# Patient Record
Sex: FEMALE | Race: WHITE | NOT HISPANIC OR LATINO | Employment: FULL TIME | URBAN - METROPOLITAN AREA
[De-identification: names, ages, dates, MRNs, and addresses within clinical notes are randomized per-mention and may not be internally consistent; named-entity substitution may affect disease eponyms.]

---

## 2023-02-06 ENCOUNTER — HOSPITAL ENCOUNTER (EMERGENCY)
Facility: HOSPITAL | Age: 28
Discharge: HOME | End: 2023-02-06
Attending: INTERNAL MEDICINE
Payer: COMMERCIAL

## 2023-02-06 VITALS
HEIGHT: 68 IN | SYSTOLIC BLOOD PRESSURE: 113 MMHG | DIASTOLIC BLOOD PRESSURE: 59 MMHG | OXYGEN SATURATION: 97 % | RESPIRATION RATE: 18 BRPM | HEART RATE: 72 BPM | WEIGHT: 143.19 LBS | TEMPERATURE: 98 F | BODY MASS INDEX: 21.7 KG/M2

## 2023-02-06 DIAGNOSIS — K62.89 ANAL PAIN: ICD-10-CM

## 2023-02-06 DIAGNOSIS — R19.8 RECTAL TENESMUS: Primary | ICD-10-CM

## 2023-02-06 LAB
ALBUMIN SERPL-MCNC: 3.8 G/DL (ref 3.5–5)
ALBUMIN/GLOB SERPL: 1.3 RATIO (ref 1.1–2)
ALP SERPL-CCNC: 98 UNIT/L (ref 40–150)
ALT SERPL-CCNC: 29 UNIT/L (ref 0–55)
APPEARANCE UR: ABNORMAL
APTT PPP: 33.7 SECONDS (ref 23.2–33.7)
AST SERPL-CCNC: 18 UNIT/L (ref 5–34)
B-HCG SERPL QL: NEGATIVE
BACTERIA #/AREA URNS AUTO: ABNORMAL /HPF
BASOPHILS # BLD AUTO: 0.04 X10(3)/MCL (ref 0–0.2)
BASOPHILS NFR BLD AUTO: 0.6 %
BILIRUB UR QL STRIP.AUTO: NEGATIVE MG/DL
BILIRUBIN DIRECT+TOT PNL SERPL-MCNC: 0.4 MG/DL
BUN SERPL-MCNC: 6 MG/DL (ref 7–18.7)
CALCIUM SERPL-MCNC: 9 MG/DL (ref 8.4–10.2)
CHLORIDE SERPL-SCNC: 109 MMOL/L (ref 98–107)
CO2 SERPL-SCNC: 26 MMOL/L (ref 22–29)
COLOR UR AUTO: YELLOW
CREAT SERPL-MCNC: 0.85 MG/DL (ref 0.55–1.02)
EOSINOPHIL # BLD AUTO: 0.09 X10(3)/MCL (ref 0–0.9)
EOSINOPHIL NFR BLD AUTO: 1.3 %
ERYTHROCYTE [DISTWIDTH] IN BLOOD BY AUTOMATED COUNT: 12.5 % (ref 11.5–17)
GFR SERPLBLD CREATININE-BSD FMLA CKD-EPI: >60 MLS/MIN/1.73/M2
GLOBULIN SER-MCNC: 2.9 GM/DL (ref 2.4–3.5)
GLUCOSE SERPL-MCNC: 72 MG/DL (ref 74–100)
GLUCOSE UR QL STRIP.AUTO: NEGATIVE MG/DL
HCT VFR BLD AUTO: 43.8 % (ref 37–47)
HGB BLD-MCNC: 14.3 GM/DL (ref 12–16)
IMM GRANULOCYTES # BLD AUTO: 0.02 X10(3)/MCL (ref 0–0.04)
IMM GRANULOCYTES NFR BLD AUTO: 0.3 %
INR BLD: 0.97 (ref 0–1.3)
KETONES UR QL STRIP.AUTO: NEGATIVE MG/DL
LEUKOCYTE ESTERASE UR QL STRIP.AUTO: ABNORMAL UNIT/L
LYMPHOCYTES # BLD AUTO: 1.64 X10(3)/MCL (ref 0.6–4.6)
LYMPHOCYTES NFR BLD AUTO: 23.4 %
MCH RBC QN AUTO: 30.3 PG
MCHC RBC AUTO-ENTMCNC: 32.6 MG/DL (ref 33–36)
MCV RBC AUTO: 92.8 FL (ref 80–94)
MONOCYTES # BLD AUTO: 0.7 X10(3)/MCL (ref 0.1–1.3)
MONOCYTES NFR BLD AUTO: 10 %
NEUTROPHILS # BLD AUTO: 4.52 X10(3)/MCL (ref 2.1–9.2)
NEUTROPHILS NFR BLD AUTO: 64.4 %
NITRITE UR QL STRIP.AUTO: NEGATIVE
PH UR STRIP.AUTO: 7.5 [PH]
PLATELET # BLD AUTO: 240 X10(3)/MCL (ref 130–400)
PMV BLD AUTO: 10.6 FL (ref 7.4–10.4)
POTASSIUM SERPL-SCNC: 3.8 MMOL/L (ref 3.5–5.1)
PROT SERPL-MCNC: 6.7 GM/DL (ref 6.4–8.3)
PROT UR QL STRIP.AUTO: NEGATIVE MG/DL
PROTHROMBIN TIME: 13.2 SECONDS (ref 12.5–14.5)
RBC # BLD AUTO: 4.72 X10(6)/MCL (ref 4.2–5.4)
RBC #/AREA URNS AUTO: ABNORMAL /HPF
RBC UR QL AUTO: ABNORMAL UNIT/L
SODIUM SERPL-SCNC: 141 MMOL/L (ref 136–145)
SP GR UR STRIP.AUTO: 1.02
SQUAMOUS #/AREA URNS AUTO: ABNORMAL /HPF
UROBILINOGEN UR STRIP-ACNC: 0.2 MG/DL
WBC # SPEC AUTO: 7 X10(3)/MCL (ref 4.5–11.5)
WBC #/AREA URNS AUTO: ABNORMAL /HPF

## 2023-02-06 PROCEDURE — 25000003 PHARM REV CODE 250: Performed by: INTERNAL MEDICINE

## 2023-02-06 PROCEDURE — 81001 URINALYSIS AUTO W/SCOPE: CPT | Performed by: INTERNAL MEDICINE

## 2023-02-06 PROCEDURE — 85730 THROMBOPLASTIN TIME PARTIAL: CPT | Performed by: INTERNAL MEDICINE

## 2023-02-06 PROCEDURE — 80053 COMPREHEN METABOLIC PANEL: CPT | Performed by: INTERNAL MEDICINE

## 2023-02-06 PROCEDURE — 25500020 PHARM REV CODE 255: Performed by: INTERNAL MEDICINE

## 2023-02-06 PROCEDURE — 63600175 PHARM REV CODE 636 W HCPCS: Performed by: INTERNAL MEDICINE

## 2023-02-06 PROCEDURE — 99285 EMERGENCY DEPT VISIT HI MDM: CPT | Mod: 25

## 2023-02-06 PROCEDURE — 85610 PROTHROMBIN TIME: CPT | Performed by: INTERNAL MEDICINE

## 2023-02-06 PROCEDURE — 85025 COMPLETE CBC W/AUTO DIFF WBC: CPT | Performed by: INTERNAL MEDICINE

## 2023-02-06 PROCEDURE — 96361 HYDRATE IV INFUSION ADD-ON: CPT

## 2023-02-06 PROCEDURE — 96374 THER/PROPH/DIAG INJ IV PUSH: CPT

## 2023-02-06 PROCEDURE — 81025 URINE PREGNANCY TEST: CPT | Performed by: INTERNAL MEDICINE

## 2023-02-06 RX ORDER — HYDROMORPHONE HYDROCHLORIDE 2 MG/ML
0.5 INJECTION, SOLUTION INTRAMUSCULAR; INTRAVENOUS; SUBCUTANEOUS
Status: COMPLETED | OUTPATIENT
Start: 2023-02-06 | End: 2023-02-06

## 2023-02-06 RX ORDER — DEXTROAMPHETAMINE SACCHARATE, AMPHETAMINE ASPARTATE, DEXTROAMPHETAMINE SULFATE AND AMPHETAMINE SULFATE 2.5; 2.5; 2.5; 2.5 MG/1; MG/1; MG/1; MG/1
1 TABLET ORAL 2 TIMES DAILY
COMMUNITY
Start: 2023-01-17

## 2023-02-06 RX ORDER — NAPROXEN 500 MG/1
500 TABLET ORAL 2 TIMES DAILY WITH MEALS
Qty: 30 TABLET | Refills: 0 | Status: SHIPPED | OUTPATIENT
Start: 2023-02-06 | End: 2023-02-21

## 2023-02-06 RX ORDER — LORAZEPAM 0.5 MG/1
0.5 TABLET ORAL EVERY 8 HOURS PRN
COMMUNITY
Start: 2022-10-11

## 2023-02-06 RX ORDER — ONDANSETRON HYDROCHLORIDE 8 MG/1
8 TABLET, FILM COATED ORAL EVERY 8 HOURS PRN
COMMUNITY
Start: 2023-01-17

## 2023-02-06 RX ORDER — DOCUSATE SODIUM 100 MG/1
100 CAPSULE, LIQUID FILLED ORAL 2 TIMES DAILY
Qty: 60 CAPSULE | Refills: 0 | Status: SHIPPED | OUTPATIENT
Start: 2023-02-06 | End: 2023-03-08

## 2023-02-06 RX ORDER — DOXYCYCLINE 100 MG/1
100 CAPSULE ORAL EVERY 12 HOURS
Qty: 20 CAPSULE | Refills: 0 | Status: SHIPPED | OUTPATIENT
Start: 2023-02-06 | End: 2023-02-16

## 2023-02-06 RX ADMIN — HYDROMORPHONE HYDROCHLORIDE 0.5 MG: 2 INJECTION INTRAMUSCULAR; INTRAVENOUS; SUBCUTANEOUS at 10:02

## 2023-02-06 RX ADMIN — SODIUM CHLORIDE 1000 ML: 9 INJECTION, SOLUTION INTRAVENOUS at 10:02

## 2023-02-06 RX ADMIN — IOPAMIDOL 100 ML: 755 INJECTION, SOLUTION INTRAVENOUS at 12:02

## 2023-02-06 NOTE — DISCHARGE INSTRUCTIONS
See your surgeon for follow-up and re-evaluation    Take medicines as prescribed    See your family doctor in one to 2 days for further evaluation, workup, and treatment as necessary    Avoid driving or operating machinery while taking medicines as some medicines might cause drowsiness and may cause problems. Also pain medicines have potential of being addictive  so use Pain meds specially Narcotics Sparingly.    The exam and treatment you received in Emergency Room was for an urgent problem and NOT INTENDED AS COMPLETE CARE. It is important that you FOLLOW UP with a doctor for ongoing care. If your symptoms become WORSE or you DO NOT IMPROVE and you are unable to reach your health care provider, you should RETURN to the emergency department. The Emergency Room doctor has provided a PRELIMINARY INTERPRETATION of all your STUDIES. A final interpretation may be done after you are discharged. IF A CHANGE in your diagnosis or treatment is needed WE WILL CONTACT YOU. It is critical that we have a CURRENT PHONE NUMBER FOR YOU.

## 2023-02-06 NOTE — ED PROVIDER NOTES
02/06/2023         9:53 AM    Source of History:  History obtained from the patient.     Chief complaint:  From Nurse Triage:  Abscess (C/o nasim-rectal abscess 1 year ago and has had 6 surgeries since then for a fistula. States she thinks the rectal abscess may be back. )    HISTORY OF PRESENT ILLNES:  Kim Haskins is a 27 y.o. female presenting with Abscess (C/o nasim-rectal abscess 1 year ago and has had 6 surgeries since then for a fistula. States she thinks the rectal abscess may be back. )       Patient with history of anxiety, ADHD and perirectal abscess over a year ago had fistula and surgery for that came to visit [place] and started having rectal pain and some constipation.  She says the pain is very severe and she feels the abscess is back.    REVIEW OF SYSTEMS:   Constitutional symptoms:  No Fever. No Chills    Skin symptoms:  No Rash.    Eye symptoms:  No Visual disturbance reported.   ENMT symptoms:  No Sore throat,    Respiratory symptoms:  No Shortness of Breath, no Cough, no Wheezing.    Cardiovascular symptoms:  No Chest Pain, No Palpitations, No Tachycardia.    Gastrointestinal symptoms:  No Abdominal Pain, No Nausea, No Vomiting, No Diarrhea, Constipation.  Tenesmus   Genitourinary symptoms:  No Dysuria,    Musculoskeletal symptoms:  No Back pain,    Neurologic symptoms:  No Headache, No Dizziness.    Psychiatric symptoms:  No Anxiety, No Depression, No Substance Abuse.              Additional review of systems information: Patient Denies Any Other Complaints.    All Other Systems Reviewed With Patient And Negative.    ALLEGIES:  Review of patient's allergies indicates:   Allergen Reactions    Sulfa (sulfonamide antibiotics)        MEDICINE LIST:  Current Outpatient Medications   Medication Instructions    dextroamphetamine-amphetamine 10 mg Tab 1 tablet, Oral, 2 times daily    docusate sodium (COLACE) 100 mg, Oral, 2 times daily    doxycycline (VIBRAMYCIN) 100 mg, Oral, Every 12 hours     LORazepam (ATIVAN) 0.5 mg, Oral, Every 8 hours PRN    naproxen (NAPROSYN) 500 mg, Oral, 2 times daily with meals    ondansetron (ZOFRAN) 8 mg, Oral, Every 8 hours PRN        PMH:  As per HPI and below:    Reviewed and updated in chart.    PAST MEDICAL HISTORY:  Past Medical History:   Diagnosis Date    ADD (attention deficit disorder)     Anxiety disorder, unspecified     History of rectal abscess         PAST SURGICAL HISTORY:  Past Surgical History:   Procedure Laterality Date    RECTAL SURGERY      Perianal abscess followed by fistula repaired x6    TONSILLECTOMY         SOCIAL HISTORY:  Social History     Tobacco Use    Smoking status: Every Day     Types: Vaping with nicotine    Smokeless tobacco: Never   Substance Use Topics    Alcohol use: Yes     Comment: occasion    Drug use: Yes     Types: Marijuana       FAMILY HISTORY:  Family History   Problem Relation Age of Onset    Sleep apnea Mother     Hypertension Father     Sleep apnea Father         PROBLEM LIST:  There is no problem list on file for this patient.       PHYSICAL EXAM:      ED Triage Vitals [02/06/23 0929]   BP (!) 141/93   Pulse 81   Resp 20   Temp 97.8 °F (36.6 °C)   SpO2 100 %        Vital Signs: Reviewed As In Chart.  General:  Alert, No Cardiorespiratory Distress Noted.   Skin: Normal For Ethnic Origin  Eye:  Extraocular Movements Are Intact.   ENT: Mucus membranes are moist.   Cardiovascular:  Regular Rate And Rhythm, No Murmur, No Pedal Edema.    Respiratory:  Respirations Nonlabored, No Respiratory Distress, Good Bilateral Air Entry, No Rales, No Rhonchi.    Musculoskeletal:  No Gross Deformity Noted.   Gastrointestinal:  Soft, Non Distended, Non Tenderness, Normal Bowel Sounds.    Rectal:  No swelling, no particular induration, she does have what appears to be some pinpoint sinuses around the anal opening, no particular discharge at this time, no bleeding at this time, no in duration she does have some tenderness.  Neurological:  Alert  And Oriented To Person, Place, Time, And Situation, Normal Motor Observed, Normal Speech Observed.    Psychiatric:  Cooperative, Appropriate Mood & Affect.      ED WORKUP FOR MEDICAL DECISION MAKING:    ED ORDERS:  Orders Placed This Encounter   Procedures    CT Pelvis With Contrast    Comprehensive metabolic panel    CBC auto differential    APTT    Protime-INR    Urinalysis, Reflex to Urine Culture    CBC with Differential    Urinalysis, Microscopic    Pregnancy, urine rapid    Insert Saline lock IV       ED MEDICINES:  Medications   sodium chloride 0.9% bolus 1,000 mL 1,000 mL (0 mLs Intravenous Stopped 2/6/23 1102)   HYDROmorphone (PF) injection 0.5 mg (0.5 mg Intravenous Given 2/6/23 1001)   iopamidoL (ISOVUE-370) injection 100 mL (100 mLs Intravenous Given 2/6/23 1213)                ED LABS ORDERED AND REVIEWED:  Admission on 02/06/2023   Component Date Value Ref Range Status    Sodium Level 02/06/2023 141  136 - 145 mmol/L Final    Potassium Level 02/06/2023 3.8  3.5 - 5.1 mmol/L Final    Chloride 02/06/2023 109 (H)  98 - 107 mmol/L Final    Carbon Dioxide 02/06/2023 26  22 - 29 mmol/L Final    Glucose Level 02/06/2023 72 (L)  74 - 100 mg/dL Final    Blood Urea Nitrogen 02/06/2023 6.0 (L)  7.0 - 18.7 mg/dL Final    Creatinine 02/06/2023 0.85  0.55 - 1.02 mg/dL Final    Calcium Level Total 02/06/2023 9.0  8.4 - 10.2 mg/dL Final    Protein Total 02/06/2023 6.7  6.4 - 8.3 gm/dL Final    Albumin Level 02/06/2023 3.8  3.5 - 5.0 g/dL Final    Globulin 02/06/2023 2.9  2.4 - 3.5 gm/dL Final    Albumin/Globulin Ratio 02/06/2023 1.3  1.1 - 2.0 ratio Final    Bilirubin Total 02/06/2023 0.4  <=1.5 mg/dL Final    Alkaline Phosphatase 02/06/2023 98  40 - 150 unit/L Final    Alanine Aminotransferase 02/06/2023 29  0 - 55 unit/L Final    Aspartate Aminotransferase 02/06/2023 18  5 - 34 unit/L Final    eGFR 02/06/2023 >60  mls/min/1.73/m2 Final    PTT 02/06/2023 33.7  23.2 - 33.7 seconds Final    PT 02/06/2023 13.2  12.5 -  14.5 seconds Final    INR 02/06/2023 0.97  0.00 - 1.30 Final    Color, UA 02/06/2023 Yellow  Yellow, Light-Yellow, Dark Yellow, Sariah, Straw Final    Appearance, UA 02/06/2023 Cloudy (A)  Clear Final    Specific Gravity, UA 02/06/2023 1.020   Final    pH, UA 02/06/2023 7.5  5.0 - 8.5 Final    Protein, UA 02/06/2023 Negative  Negative mg/dL Final    Glucose, UA 02/06/2023 Negative  Negative, Normal mg/dL Final    Ketones, UA 02/06/2023 Negative  Negative mg/dL Final    Blood, UA 02/06/2023 Trace-Lysed (A)  Negative unit/L Final    Bilirubin, UA 02/06/2023 Negative  Negative mg/dL Final    Urobilinogen, UA 02/06/2023 0.2  0.2, 1.0, Normal mg/dL Final    Nitrites, UA 02/06/2023 Negative  Negative Final    Leukocyte Esterase, UA 02/06/2023 Small (A)  Negative unit/L Final    WBC 02/06/2023 7.0  4.5 - 11.5 x10(3)/mcL Final    RBC 02/06/2023 4.72  4.20 - 5.40 x10(6)/mcL Final    Hgb 02/06/2023 14.3  12.0 - 16.0 gm/dL Final    Hct 02/06/2023 43.8  37.0 - 47.0 % Final    MCV 02/06/2023 92.8  80.0 - 94.0 fL Final    MCH 02/06/2023 30.3  pg Final    MCHC 02/06/2023 32.6 (L)  33.0 - 36.0 mg/dL Final    RDW 02/06/2023 12.5  11.5 - 17.0 % Final    Platelet 02/06/2023 240  130 - 400 x10(3)/mcL Final    MPV 02/06/2023 10.6 (H)  7.4 - 10.4 fL Final    Neut % 02/06/2023 64.4  % Final    Lymph % 02/06/2023 23.4  % Final    Mono % 02/06/2023 10.0  % Final    Eos % 02/06/2023 1.3  % Final    Basophil % 02/06/2023 0.6  % Final    Lymph # 02/06/2023 1.64  0.6 - 4.6 x10(3)/mcL Final    Neut # 02/06/2023 4.52  2.1 - 9.2 x10(3)/mcL Final    Mono # 02/06/2023 0.70  0.1 - 1.3 x10(3)/mcL Final    Eos # 02/06/2023 0.09  0 - 0.9 x10(3)/mcL Final    Baso # 02/06/2023 0.04  0 - 0.2 x10(3)/mcL Final    IG# 02/06/2023 0.02  0 - 0.04 x10(3)/mcL Final    IG% 02/06/2023 0.3  % Final    Bacteria, UA 02/06/2023 Few (A)  None Seen, Rare, Occasional /HPF Final    RBC, UA 02/06/2023 0-2  None Seen, 0-2, 3-5, 0-5 /HPF Final    WBC, UA 02/06/2023 3-5  None  Seen, 0-2, 3-5, 0-5 /HPF Final    Squamous Epithelial Cells, UA 02/06/2023 Many (A)  None Seen, Rare, Occasional, Occ /HPF Final    Beta hCG Qualitative, Urine 02/06/2023 Negative  Negative Final       RADIOLOGY STUDIES ORDERED AND REVIEWED:  Imaging Results              CT Pelvis With Contrast (Final result)  Result time 02/06/23 12:24:33      Final result by Marco Grant MD (02/06/23 12:24:33)                   Impression:      1. 1 cm small round fluid density at the cervical vaginal region suspicious for nabothian cyst.  Other etiology cannot be excluded.  A pelvic sonogram would allow further evaluation  2. Intrauterine device  3. Small amount of free fluid posterior lower pelvis  4. Mild mucosal prominence and trace surrounding edema at the anal rectal junction.  No distinct perianal or perirectal abscess is identified.  Clinical correlation is indicated.      Electronically signed by: Marco Grant  Date:    02/06/2023  Time:    12:24               Narrative:    EXAMINATION:  CT PELVIS WITHOUT CONTRAST:    CLINICAL HISTORY:  Anal/rectal abscess;, .    TECHNIQUE:  PATIENT RADIATION DOSE: DLP(mGycm) 133    As per PQRS measures, all CT scans at this facility used dose modulation, iterative reconstruction, and/or weight based dose adjustment when appropriate to reduce radiation dose to as low as reasonably achievable.    COMPARISON:  None available    FINDINGS:  Serial axial images were obtained pelvis without the administration of  IV contrast. Coronal and sagittal reconstructions where also obtained. No dilated loops of bowel are identified.  Feces is evident within loops of colon.  The uterus is heterogeneous in enhancement and demonstrate a intrauterine device at the endometrium.  A few small blebs of gas is seen within the vaginal cavity.  The bladder is distended with fluid.  There is a small amount of free fluid at the posterior lower pelvis.  A small round fluid density is seen in the region of the  cervical vaginal region measuring 1 cm in diameter.  There is mild localized edema and mucosal prominence at the anal rectal junction.                                      MEDICAL DECISION MAKING:      Reviewed Nurses Note. Reviewed Vital Signs.     Reviewed Pertinent old records, History and updated as necessary.    Kim Haskins 27 y.o. female with  has a past medical history of ADD (attention deficit disorder), Anxiety disorder, unspecified, and History of rectal abscess. Comes to ER with c/o Abscess (C/o nasim-rectal abscess 1 year ago and has had 6 surgeries since then for a fistula. States she thinks the rectal abscess may be back. )      Medical Decision Making      Vitals:    02/06/23 1149   BP: 107/68   Pulse: 62   Resp:    Temp:        ED Course as of 02/06/23 1236   Mon Feb 06, 2023   1232 Patient's CT scan is negative for any perianal abscess at this time, she does have some edema locally to the anal area, I will go ahead and put her on antibiotic and pain medicine and let her go home with instruction to see her surgeon for follow-up and I will also prescribe her a stool softener. [GQ]      ED Course User Index  [GQ] Leandra Hui MD          PROCEDURES PERFORMED IN ED:  Procedures    DIAGNOSTIC IMPRESSION:        ICD-10-CM ICD-9-CM   1. Rectal tenesmus  R19.8 787.99   2. Anal pain  K62.89 569.42         ED Disposition Condition    Discharge Stable               Medication List        START taking these medications      docusate sodium 100 MG capsule  Commonly known as: COLACE  Take 1 capsule (100 mg total) by mouth 2 (two) times daily.     doxycycline 100 MG Cap  Commonly known as: VIBRAMYCIN  Take 1 capsule (100 mg total) by mouth every 12 (twelve) hours. for 10 days     naproxen 500 MG tablet  Commonly known as: NAPROSYN  Take 1 tablet (500 mg total) by mouth 2 (two) times daily with meals. for 15 days            CONTINUE taking these medications      dextroamphetamine-amphetamine 10 mg Tab      LORazepam 0.5 MG tablet  Commonly known as: ATIVAN     ondansetron 8 MG tablet  Commonly known as: ZOFRAN               Where to Get Your Medications        These medications were sent to [id number] - [place] - [address] AT [facility] [address]  [address], [place] [address]      Phone: [phone]   docusate sodium 100 MG capsule  doxycycline 100 MG Cap  naproxen 500 MG tablet           Follow-up Information       PMD In 2 days.                              ED Prescriptions       Medication Sig Dispense Start Date End Date Auth. Provider    doxycycline (VIBRAMYCIN) 100 MG Cap Take 1 capsule (100 mg total) by mouth every 12 (twelve) hours. for 10 days 20 capsule 2/6/2023 2/16/2023 Leandra Hui MD    naproxen (NAPROSYN) 500 MG tablet Take 1 tablet (500 mg total) by mouth 2 (two) times daily with meals. for 15 days 30 tablet 2/6/2023 2/21/2023 Leandra Hui MD    docusate sodium (COLACE) 100 MG capsule Take 1 capsule (100 mg total) by mouth 2 (two) times daily. 60 capsule 2/6/2023 3/8/2023 Leandra Hui MD          Follow-up Information       Follow up With Specialties Details Why Contact Info    PMD  In 2 days                 Leandra Hui MD  02/06/23 8992